# Patient Record
Sex: FEMALE | Race: WHITE | Employment: FULL TIME | URBAN - METROPOLITAN AREA
[De-identification: names, ages, dates, MRNs, and addresses within clinical notes are randomized per-mention and may not be internally consistent; named-entity substitution may affect disease eponyms.]

---
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
## 2020-05-20 ENCOUNTER — HOSPITAL ENCOUNTER (OUTPATIENT)
Age: 20
Discharge: HOME | End: 2020-05-20
Payer: COMMERCIAL

## 2020-05-20 LAB
ALBUMIN SERPL-MCNC: 4.3 G/DL (ref 3.5–5.2)
ALBUMIN/GLOBULIN RATIO: 1.3 (ref 1–2.5)
ALP BLD-CCNC: 113 U/L (ref 35–104)
ALT SERPL-CCNC: 12 U/L (ref 5–33)
ANION GAP SERPL CALCULATED.3IONS-SCNC: 11 MMOL/L (ref 9–17)
AST SERPL-CCNC: 15 U/L
BILIRUB SERPL-MCNC: <0.1 MG/DL (ref 0.3–1.2)
BUN BLDV-MCNC: 11 MG/DL (ref 6–20)
BUN/CREAT BLD: 16 (ref 9–20)
CALCIUM SERPL-MCNC: 9.2 MG/DL (ref 8.6–10.4)
CHLORIDE BLD-SCNC: 107 MMOL/L (ref 98–107)
CHOLESTEROL/HDL RATIO: 4.3
CHOLESTEROL: 159 MG/DL
CO2: 23 MMOL/L (ref 20–31)
CREAT SERPL-MCNC: 0.68 MG/DL (ref 0.5–0.9)
ESTIMATED AVERAGE GLUCOSE: 97 MG/DL
GFR AFRICAN AMERICAN: >60 ML/MIN
GFR NON-AFRICAN AMERICAN: >60 ML/MIN
GFR SERPL CREATININE-BSD FRML MDRD: ABNORMAL ML/MIN/{1.73_M2}
GFR SERPL CREATININE-BSD FRML MDRD: ABNORMAL ML/MIN/{1.73_M2}
GLUCOSE BLD-MCNC: 92 MG/DL (ref 70–99)
HBA1C MFR BLD: 5 % (ref 4.8–5.9)
HDLC SERPL-MCNC: 37 MG/DL
LDL CHOLESTEROL: 94 MG/DL (ref 0–130)
POTASSIUM SERPL-SCNC: 4.2 MMOL/L (ref 3.7–5.3)
SODIUM BLD-SCNC: 141 MMOL/L (ref 135–144)
T3 FREE: 3.15 PG/ML (ref 2.02–4.43)
T3 TOTAL: 115 NG/DL (ref 80–200)
THYROXINE, FREE: 1.13 NG/DL (ref 0.93–1.7)
TOTAL PROTEIN: 7.7 G/DL (ref 6.4–8.3)
TRIGL SERPL-MCNC: 141 MG/DL
TSH SERPL DL<=0.05 MIU/L-ACNC: 0.87 MIU/L (ref 0.3–5)
VITAMIN D 25-HYDROXY: 10.2 NG/ML (ref 30–100)
VLDLC SERPL CALC-MCNC: ABNORMAL MG/DL (ref 1–30)

## 2020-05-20 PROCEDURE — 80061 LIPID PANEL: CPT

## 2020-05-20 PROCEDURE — 36415 COLL VENOUS BLD VENIPUNCTURE: CPT

## 2020-05-20 PROCEDURE — 84480 ASSAY TRIIODOTHYRONINE (T3): CPT

## 2020-05-20 PROCEDURE — 84443 ASSAY THYROID STIM HORMONE: CPT

## 2020-05-20 PROCEDURE — 82306 VITAMIN D 25 HYDROXY: CPT

## 2020-05-20 PROCEDURE — 84439 ASSAY OF FREE THYROXINE: CPT

## 2020-05-20 PROCEDURE — 84481 FREE ASSAY (FT-3): CPT

## 2020-05-20 PROCEDURE — 83036 HEMOGLOBIN GLYCOSYLATED A1C: CPT

## 2020-05-20 PROCEDURE — 80053 COMPREHEN METABOLIC PANEL: CPT

## 2020-06-10 ENCOUNTER — OFFICE VISIT (OUTPATIENT)
Dept: PRIMARY CARE CLINIC | Age: 20
End: 2020-06-10
Payer: COMMERCIAL

## 2020-06-10 VITALS
BODY MASS INDEX: 38.19 KG/M2 | DIASTOLIC BLOOD PRESSURE: 80 MMHG | HEIGHT: 65 IN | SYSTOLIC BLOOD PRESSURE: 118 MMHG | WEIGHT: 229.2 LBS | HEART RATE: 111 BPM | OXYGEN SATURATION: 98 %

## 2020-06-10 PROCEDURE — 90471 IMMUNIZATION ADMIN: CPT | Performed by: FAMILY MEDICINE

## 2020-06-10 PROCEDURE — 99395 PREV VISIT EST AGE 18-39: CPT | Performed by: FAMILY MEDICINE

## 2020-06-10 PROCEDURE — 90651 9VHPV VACCINE 2/3 DOSE IM: CPT | Performed by: FAMILY MEDICINE

## 2020-06-10 RX ORDER — ERGOCALCIFEROL 1.25 MG/1
50000 CAPSULE ORAL WEEKLY
Qty: 12 CAPSULE | Refills: 0 | Status: SHIPPED | OUTPATIENT
Start: 2020-06-10 | End: 2020-09-08 | Stop reason: SDUPTHER

## 2020-06-10 ASSESSMENT — ENCOUNTER SYMPTOMS
ABDOMINAL PAIN: 0
VOMITING: 0
NAUSEA: 0
CHEST TIGHTNESS: 0
SORE THROAT: 0
SHORTNESS OF BREATH: 0

## 2020-06-10 ASSESSMENT — PATIENT HEALTH QUESTIONNAIRE - PHQ9
SUM OF ALL RESPONSES TO PHQ QUESTIONS 1-9: 2
SUM OF ALL RESPONSES TO PHQ9 QUESTIONS 1 & 2: 2
SUM OF ALL RESPONSES TO PHQ QUESTIONS 1-9: 2
2. FEELING DOWN, DEPRESSED OR HOPELESS: 1
1. LITTLE INTEREST OR PLEASURE IN DOING THINGS: 1

## 2020-06-10 NOTE — PROGRESS NOTES
questions answered. Pt voiced understanding. Reviewed healthmaintenance. Instructed to continue current medications, diet and exercise. Patient agreed with treatment plan. Follow up as directed.      Electronically signed by Nati Muñiz DO on 6/10/2020 at 4:16 PM

## 2020-07-28 ENCOUNTER — OFFICE VISIT (OUTPATIENT)
Dept: OBGYN CLINIC | Age: 20
End: 2020-07-28
Payer: COMMERCIAL

## 2020-07-28 ENCOUNTER — HOSPITAL ENCOUNTER (OUTPATIENT)
Age: 20
Setting detail: SPECIMEN
Discharge: HOME | End: 2020-07-28
Payer: COMMERCIAL

## 2020-07-28 VITALS
RESPIRATION RATE: 16 BRPM | SYSTOLIC BLOOD PRESSURE: 120 MMHG | TEMPERATURE: 97.2 F | BODY MASS INDEX: 38.44 KG/M2 | WEIGHT: 231 LBS | DIASTOLIC BLOOD PRESSURE: 70 MMHG

## 2020-07-28 LAB
CONTROL: PRESENT
DIRECT EXAM: NORMAL
Lab: NORMAL
PREGNANCY TEST URINE, POC: NEGATIVE
SPECIMEN DESCRIPTION: NORMAL

## 2020-07-28 PROCEDURE — G8417 CALC BMI ABV UP PARAM F/U: HCPCS | Performed by: NURSE PRACTITIONER

## 2020-07-28 PROCEDURE — 81025 URINE PREGNANCY TEST: CPT | Performed by: NURSE PRACTITIONER

## 2020-07-28 PROCEDURE — G8427 DOCREV CUR MEDS BY ELIG CLIN: HCPCS | Performed by: NURSE PRACTITIONER

## 2020-07-28 PROCEDURE — 99213 OFFICE O/P EST LOW 20 MIN: CPT | Performed by: NURSE PRACTITIONER

## 2020-07-28 PROCEDURE — 4004F PT TOBACCO SCREEN RCVD TLK: CPT | Performed by: NURSE PRACTITIONER

## 2020-07-28 RX ORDER — MISOPROSTOL 200 UG/1
TABLET ORAL
Qty: 2 TABLET | Refills: 0 | Status: SHIPPED | OUTPATIENT
Start: 2020-07-28

## 2020-07-28 ASSESSMENT — ENCOUNTER SYMPTOMS
PHOTOPHOBIA: 0
SHORTNESS OF BREATH: 0
NAUSEA: 0
COLOR CHANGE: 0
VOMITING: 0
WHEEZING: 0

## 2020-07-28 NOTE — PATIENT INSTRUCTIONS
Patient Education       Learning About Birth Control: Intrauterine Device (IUD)  What is an intrauterine device (IUD)? The intrauterine device (IUD) is used to prevent pregnancy. It's a small, plastic, T-shaped device. Your doctor places the IUD in your uterus. If you and your doctor discuss it before you give birth, this can be done right after you have your baby. You have a choice between a hormonal IUD and a copper IUD. The hormonal IUD can prevent pregnancy for 3 to 6 years, depending on which IUD is used. But your doctor may talk to you about leaving it in for longer. Once you have it, you don't have to do anything else to prevent pregnancy. The copper IUD can prevent pregnancy for 10 years. But your doctor may talk to you about leaving it in for longer. Once you have it, you don't have to do anything else to prevent pregnancy. A string tied to the end of the IUD hangs down through the opening of the uterus (called the cervix) into the vagina. You can check that the IUD is in place by feeling for the string. The IUD usually stays in the uterus until your doctor removes it. How well does it work? In the first year of use:  · When the hormonal IUD is used exactly as directed, fewer than 1 woman out of 100 has an unplanned pregnancy. · When the copper IUD is used exactly as directed, fewer than 1 woman out of 100 has an unplanned pregnancy. Be sure to tell your doctor about any health problems you have or medicines you take. He or she can help you choose the birth control method that is right for you. What are the advantages of an IUD? · An IUD is one of the most effective methods of birth control. · It prevents pregnancy for 3 to 10 years, depending on the type. You don't have to worry about birth control during this time. · It's safe to use while breastfeeding. · IUDs don't contain estrogen.  So you can use an IUD if you don't want to take estrogen or can't take estrogen because you have certain health problems or concerns. · An IUD is convenient. It is always providing birth control. You don't need to remember to take a pill or get a shot. You don't have to interrupt sex to protect against pregnancy. · A hormonal IUD may reduce heavy bleeding and cramping. What are the disadvantages of an IUD? · An IUD doesn't protect against sexually transmitted infections (STIs), such as herpes or HIV/AIDS. If you aren't sure if your sex partner might have an STI, use a condom to protect against disease. · A copper IUD may cause periods with more bleeding and cramping. · You have to see a doctor to have an IUD inserted and removed. Where can you learn more? Go to https://SplorepeAleaeb.healthBrowsy. org and sign in to your "[employer] [place]" account. Enter R862 in the Data Symmetry box to learn more about \"Learning About Birth Control: Intrauterine Device (IUD). \"     If you do not have an account, please click on the \"Sign Up Now\" link. Current as of: February 11, 2020               Content Version: 12.5  © 6974-5015 Healthwise, Incorporated. Care instructions adapted under license by [facility] ([facility]). If you have questions about a medical condition or this instruction, always ask your healthcare professional. [address] any warranty or liability for your use of this information.

## 2020-07-28 NOTE — PROGRESS NOTES
[facility] PHYSICIANS  [facility]  [id number] [place] [address] Maisha Connolly  [address] [address]  Dept: [phone]  Dept Fax: [phone]    Thi Sahu is a 21 y.o. female who presents today for her medical conditions/complaintsas noted below. Thi Sahu is c/o of Other (discuss birth control options interested in IUD )        HPI:     HPI    Thi Sahu is here to discuss hx of irregular periods/ painful periods and treatment options. Her cycle is every 28-30 days and lasts 5-7 days. She has had hx of irregular periods when she was younger and would go months without periods, she has hx of painful periods, states has tried to learn to deal withit. HX previous contraception usage condoms only   Menses age 15-16  LMP 6/28/20  Sexually active yes, 1st coitus 25, number of partners 8, has been  With ne partner for past year.    Last PAP: has never had  Hx of STI denies  Last sexually active 3 weeks ago    She reports there is a personal history or family history of:    Smoking (> 15 cigs/day): yes,     Migraine with Aura: no    HTN (> 160/100):  no    DVT:  no    Thrombophilias:  no    Stroke (CVA): no    Ischemic heart disease:  no    Valvular heart disease (A Fib, Pul HTN, etc): no    Positive Antiphospholipid Abs:  no    Liver Disease:  no        Past Medical History:   Diagnosis Date    Anxiety     Prediabetes     Seizures (HCC)     child      Past Surgical History:   Procedure Laterality Date    WISDOM TOOTH EXTRACTION         Family History   Problem Relation Age of Onset    Diabetes Father     Cancer Maternal Grandfather     Cancer Paternal Grandmother        Social History     Tobacco Use    Smoking status: Current Some Day Smoker    Smokeless tobacco: Never Used    Tobacco comment: juul   Substance Use Topics    Alcohol use: No      Current Outpatient Medications   Medication Sig Dispense Refill    miSOPROStol (CYTOTEC) 200 MCG tablet Take one tablet by mouth night prior to the procedure and the second tablet the morning of procedure. 2 tablet 0    lurasidone (LATUDA) 40 MG TABS tablet Take 40 mg by mouth daily      vitamin D (ERGOCALCIFEROL) 1.25 MG (51383 UT) CAPS capsule Take 1 capsule by mouth once a week 12 capsule 0    busPIRone (BUSPAR) 5 MG tablet Take 5 mg by mouth 3 times daily      benzoyl peroxide 5 % external liquid Wash face, chest, back, buttocks and thighs daily 227 g 6    tretinoin (RETIN-A) 0.025 % cream Apply pea sized amount to face, chest and back nightly 20 g 6     No current facility-administered medications for this visit. No Known Allergies    Health Maintenance   Topic Date Due    Pneumococcal 0-64 years Vaccine (1 of 1 - PPSV23) 02/16/2006    Chlamydia screen  02/16/2016    Flu vaccine (1) 09/01/2020    DTaP/Tdap/Td vaccine (7 - Tdap) 06/08/2022    Hepatitis B vaccine  Completed    Hib vaccine  Completed    HPV vaccine  Completed    Varicella vaccine  Completed    Meningococcal (ACWY) vaccine  Completed    HIV screen  Completed    Hepatitis A vaccine  Aged Out       :     Review of Systems   Constitutional: Negative for chills, fatigue and fever. Eyes: Negative for photophobia and visual disturbance. Respiratory: Negative for shortness of breath and wheezing. Cardiovascular: Negative for chest pain, palpitations and leg swelling. Gastrointestinal: Negative for nausea and vomiting. Endocrine: Negative for cold intolerance, heat intolerance, polydipsia and polyuria. Genitourinary: Positive for menstrual problem. Negative for dyspareunia, dysuria, flank pain, frequency, pelvic pain, vaginal bleeding, vaginal discharge and vaginal pain. Musculoskeletal: Negative for neck pain and neck stiffness. Skin: Negative for color change and pallor. Neurological: Negative for dizziness, light-headedness and headaches. Hematological: Negative for adenopathy. Does not bruise/bleed easily.    Psychiatric/Behavioral: Negative for self-injury and suicidal ideas. Objective:     Physical Exam  Vitals signs and nursing note reviewed. Constitutional:       General: She is not in acute distress. Appearance: She is well-developed. She is not diaphoretic. HENT:      Head: Normocephalic and atraumatic. Right Ear: External ear normal.      Left Ear: External ear normal.      Nose: Nose normal.   Eyes:      Pupils: Pupils are equal, round, and reactive to light. Neck:      Musculoskeletal: Normal range of motion and neck supple. Cardiovascular:      Rate and Rhythm: Normal rate and regular rhythm. Heart sounds: Normal heart sounds. No murmur. No friction rub. No gallop. Pulmonary:      Effort: Pulmonary effort is normal.      Breath sounds: Normal breath sounds. No wheezing. Abdominal:      General: Bowel sounds are normal.      Palpations: Abdomen is soft. Abdomen is not rigid. Tenderness: There is no abdominal tenderness. There is no guarding or rebound. Hernia: There is no hernia in the left inguinal area. Genitourinary:     Labia:         Right: No rash, tenderness, lesion or injury. Left: No rash, tenderness, lesion or injury. Vagina: No signs of injury and foreign body. Vaginal discharge present. No erythema, tenderness or bleeding. Cervix: No cervical motion tenderness, discharge or friability. Uterus: Not enlarged and not tender. Adnexa:         Right: No mass, tenderness or fullness. Left: No mass, tenderness or fullness. Musculoskeletal: Normal range of motion. Skin:     General: Skin is warm and dry. Findings: No rash. Neurological:      Mental Status: She is alert and oriented to person, place, and time. Cranial Nerves: No cranial nerve deficit. Psychiatric:         Behavior: Behavior normal.         Thought Content:  Thought content normal.         Judgment: Judgment normal.       /70 (Site: Left Upper Arm, Position: Sitting, Cuff Size: Large Adult)   Temp 97.2 °F (36.2 °C) (Temporal)   Resp 16   Wt 231 lb (104.8 kg)   LMP 06/28/2020 (Approximate)   Breastfeeding No   BMI 38.44 kg/m²     Assessment:       Diagnosis Orders   1. Irregular periods  VAGINITIS DNA PROBE    C.trachomatis N.gonorrhoeae DNA   2. Amenorrhea  POCT urine pregnancy       :        1. Jewell Moore  was seen today for her complaint. She was counseled on the various forms of contraception, including the male forms, the reversible forms and the non-reversible forms as needed. She desired to have IUD placed. She was last sexually active 3 weeks ago. She had negative pregnancy test. She will call us as soon as she starts her menses. Discussed and counseled on all forms hormonal contraceptives and she would like to have IUD placed. Discussed R/B/A with IUD. Cultures collected and sent. Abstain sexual intercourse until IUD placement, if 2 weeks can have urine hcg in office if not needs serum hcg day prior to placement. encouraged to return to office on menses for IUD placement   Cytotec 200mg night before and morning of placement of IUD. She was made aware that hormone-based contraception may increase her risk of developing a blood clot which may in turn increase both her mortality and morbidity risks. The life-threatening side effects discussed included SOB, chest pains, severe HA's and/or calf pain (ACHES). She was advised that if these occur, she is to stop her contraception, notify our office and present to the ER for evaluation. She is  a smoker: As a smoker, she was informed that smoking may further increase these risks. She was encouraged to stop smoking and information was provided. She was instructed to use Barrier protection for STI prevention at all times. She was informed of potential drug interaction with antibiotics, herbals and seizure medications and was instructed to use barrier protection as secondary back up as needed.   2.  She was advised of the need for yearly follow up, has not had pap. Return for as soon as starts menses iud placement. Orders Placed This Encounter   Procedures    VAGINITIS DNA PROBE     Standing Status:   Future     Number of Occurrences:   1     Standing Expiration Date:   7/28/2021    C.trachomatis N.gonorrhoeae DNA     Standing Status:   Future     Number of Occurrences:   1     Standing Expiration Date:   7/29/2021    POCT urine pregnancy     Orders Placed This Encounter   Medications    miSOPROStol (CYTOTEC) 200 MCG tablet     Sig: Take one tablet by mouth night prior to the procedure and the second tablet the morning of procedure. Dispense:  2 tablet     Refill:  0       Patient given educational materials - seepatient instructions. Discussed use, benefit, and side effects of prescribed medications. All patient questions answered. Pt voiced understanding. Reviewed health maintenance. Instructed to continue current medications, diet and exercise. Patient agreedwith treatment plan. Follow up as directed. Electronically signed by MANDEEP Dowell CNP on 7/28/2020at 6:04 PM      Of the 15 minute duration appointment visit, Sara Ferrell CNP spent at least 50% of the face-to-face time in counseling, explanation of diagnosis, planning of further management, and answering all questions.

## 2020-07-29 LAB
C TRACH DNA GENITAL QL NAA+PROBE: NEGATIVE
N. GONORRHOEAE DNA: NEGATIVE
SPECIMEN DESCRIPTION: NORMAL

## 2020-08-11 ENCOUNTER — TELEPHONE (OUTPATIENT)
Dept: PRIMARY CARE CLINIC | Age: 20
End: 2020-08-11

## 2020-09-08 RX ORDER — ERGOCALCIFEROL 1.25 MG/1
CAPSULE ORAL
Qty: 12 CAPSULE | Refills: 0 | Status: SHIPPED | OUTPATIENT
Start: 2020-09-08 | End: 2020-12-03

## 2020-09-08 NOTE — TELEPHONE ENCOUNTER
Last OV 06/10/2020    Health Maintenance   Topic Date Due    Pneumococcal 0-64 years Vaccine (1 of 1 - PPSV23) 02/16/2006    Flu vaccine (1) 09/01/2020    Chlamydia screen  07/28/2021    DTaP/Tdap/Td vaccine (7 - Tdap) 06/08/2022    Hepatitis B vaccine  Completed    Hib vaccine  Completed    HPV vaccine  Completed    Varicella vaccine  Completed    Meningococcal (ACWY) vaccine  Completed    HIV screen  Completed    Hepatitis A vaccine  Aged Out             (applicable per patient's age: Cancer Screenings, Depression Screening, Fall Risk Screening, Immunizations)    Hemoglobin A1C (%)   Date Value   05/20/2020 5.0   12/10/2019 5.0   06/05/2019 5.3     LDL Cholesterol (mg/dL)   Date Value   05/20/2020 94     AST (U/L)   Date Value   05/20/2020 15     ALT (U/L)   Date Value   05/20/2020 12     BUN (mg/dL)   Date Value   05/20/2020 11      (goal A1C is < 7)   (goal LDL is <100) need 30-50% reduction from baseline     BP Readings from Last 3 Encounters:   07/28/20 120/70   06/10/20 118/80   12/09/19 118/82    (goal /80)      All Future Testing planned in [facility]:  Lab Frequency Next Occurrence   Vitamin D 25 Hydroxy Once 10/16/2020       Next Visit Date:  No future appointments.          Patient Active Problem List:     Insulin resistance     Panic attack

## 2020-10-02 LAB
CHOLESTEROL, TOTAL: 178 MG/DL
CHOLESTEROL/HDL RATIO: 4.9
HDLC SERPL-MCNC: 36 MG/DL (ref 35–70)
LDL CHOLESTEROL CALCULATED: 117 MG/DL (ref 0–160)
NONHDLC SERPL-MCNC: 142 MG/DL
TRIGL SERPL-MCNC: 124 MG/DL
VLDLC SERPL CALC-MCNC: 25 MG/DL

## 2020-12-03 RX ORDER — ERGOCALCIFEROL 1.25 MG/1
CAPSULE ORAL
Qty: 12 CAPSULE | Refills: 0 | Status: SHIPPED | OUTPATIENT
Start: 2020-12-03

## 2021-04-22 ENCOUNTER — OFFICE VISIT (OUTPATIENT)
Dept: PRIMARY CARE CLINIC | Age: 21
End: 2021-04-22
Payer: COMMERCIAL

## 2021-04-22 VITALS
BODY MASS INDEX: 38.15 KG/M2 | WEIGHT: 229 LBS | DIASTOLIC BLOOD PRESSURE: 80 MMHG | SYSTOLIC BLOOD PRESSURE: 118 MMHG | HEIGHT: 65 IN

## 2021-04-22 DIAGNOSIS — R11.0 NAUSEA: ICD-10-CM

## 2021-04-22 DIAGNOSIS — L65.9 HAIR LOSS: Primary | ICD-10-CM

## 2021-04-22 DIAGNOSIS — L70.0 ACNE VULGARIS: ICD-10-CM

## 2021-04-22 PROCEDURE — 4004F PT TOBACCO SCREEN RCVD TLK: CPT | Performed by: FAMILY MEDICINE

## 2021-04-22 PROCEDURE — 99214 OFFICE O/P EST MOD 30 MIN: CPT | Performed by: FAMILY MEDICINE

## 2021-04-22 PROCEDURE — G8427 DOCREV CUR MEDS BY ELIG CLIN: HCPCS | Performed by: FAMILY MEDICINE

## 2021-04-22 PROCEDURE — G8417 CALC BMI ABV UP PARAM F/U: HCPCS | Performed by: FAMILY MEDICINE

## 2021-04-22 RX ORDER — CLINDAMYCIN AND BENZOYL PEROXIDE 10; 50 MG/G; MG/G
GEL TOPICAL
Qty: 50 G | Refills: 1 | Status: SHIPPED | OUTPATIENT
Start: 2021-04-22

## 2021-04-22 RX ORDER — PROPRANOLOL HYDROCHLORIDE 10 MG/1
TABLET ORAL
COMMUNITY
Start: 2021-04-02

## 2021-04-22 ASSESSMENT — ENCOUNTER SYMPTOMS
ABDOMINAL PAIN: 0
SHORTNESS OF BREATH: 0
NAUSEA: 1
VOMITING: 0
ROS SKIN COMMENTS: ACNE

## 2021-04-22 ASSESSMENT — PATIENT HEALTH QUESTIONNAIRE - PHQ9
1. LITTLE INTEREST OR PLEASURE IN DOING THINGS: 0
SUM OF ALL RESPONSES TO PHQ QUESTIONS 1-9: 0
SUM OF ALL RESPONSES TO PHQ QUESTIONS 1-9: 0

## 2021-04-22 NOTE — PROGRESS NOTES
[address] PRIMARY CARE  [facility] [address] [facility] [address]  [address] [address]  Dept: [phone]  Dept Fax: [phone]    Neo Bruce is a 24 y.o. female who presents today for her medical conditions/complaints as noted below. Neo Bruce is c/o of  Chief Complaint   Patient presents with    Acne    Alopecia    Nausea       HPI:     HPI     Pt here due to acne/alopecia/nausea    She has noted has been having hair loss for past few months. Mainly receding hair line and when she brushes notes hair loss. Has acne on face, has been using cerave face wash which has been helpful. Notes also she has some  nausea with food for few months. Has regular periods,  last period. Notes it with sweets and chocolate or soda and lemonade can get some nausea. States she has not been feeling nauseous with other foods.        Hemoglobin A1C (%)   Date Value   2020 5.0   12/10/2019 5.0   2019 5.3             ( goal A1C is < 7)   No results found for: LABMICR  LDL Cholesterol (mg/dL)   Date Value   2020 94     LDL Calculated (mg/dL)   Date Value   10/02/2020 117       (goal LDL is <100)   AST (U/L)   Date Value   2020 15     ALT (U/L)   Date Value   2020 12     BUN (mg/dL)   Date Value   2020 11     BP Readings from Last 3 Encounters:   21 118/80   20 120/70   06/10/20 118/80          (goal 120/80)    Past Medical History:   Diagnosis Date    Anxiety     Prediabetes     Seizures (HCC)     child      Past Surgical History:   Procedure Laterality Date    WISDOM TOOTH EXTRACTION         Family History   Problem Relation Age of Onset    Diabetes Father     Cancer Maternal Grandfather     Cancer Paternal Grandmother        Social History     Tobacco Use    Smoking status: Current Some Day Smoker    Smokeless tobacco: Never Used    Tobacco comment: juul   Substance Use Topics    Alcohol use: No      Current Outpatient Medications   Medication Sig Dispense Refill    clindamycin-benzoyl peroxide (BENZACLIN) 1-5 % gel Apply topically 2 times daily. 50 g 1    vitamin D (ERGOCALCIFEROL) 1.25 MG (74554 UT) CAPS capsule TAKE ONE CAPSULE BY MOUTH ONCE WEEKLY 12 capsule 0    lurasidone (LATUDA) 40 MG TABS tablet Take 40 mg by mouth daily      benzoyl peroxide 5 % external liquid Wash face, chest, back, buttocks and thighs daily 227 g 6    tretinoin (RETIN-A) 0.025 % cream Apply pea sized amount to face, chest and back nightly 20 g 6    propranolol (INDERAL) 10 MG tablet       miSOPROStol (CYTOTEC) 200 MCG tablet Take one tablet by mouth night prior to the procedure and the second tablet the morning of procedure. (Patient not taking: Reported on 4/22/2021) 2 tablet 0    busPIRone (BUSPAR) 5 MG tablet Take 5 mg by mouth 3 times daily       No current facility-administered medications for this visit. No Known Allergies    Health Maintenance   Topic Date Due    Hepatitis C screen  Never done    Pneumococcal 0-64 years Vaccine (1 of 1 - PPSV23) Never done    COVID-19 Vaccine (1) Never done    Cervical cancer screen  Never done    Chlamydia screen  07/28/2021    Flu vaccine (Season Ended) 09/01/2021    DTaP/Tdap/Td vaccine (7 - Tdap) 06/08/2022    Hepatitis B vaccine  Completed    Hib vaccine  Completed    HPV vaccine  Completed    Varicella vaccine  Completed    Meningococcal (ACWY) vaccine  Completed    HIV screen  Completed    Hepatitis A vaccine  Aged Out       Subjective:      Review of Systems   Constitutional: Negative for chills and fever. Respiratory: Negative for shortness of breath. Gastrointestinal: Positive for nausea. Negative for abdominal pain, diarrhea and vomiting. Endocrine:        Hair loss   Skin:        Acne         Objective:     Physical Exam  Constitutional:       Appearance: She is well-developed. HENT:      Head: Normocephalic and atraumatic.    Eyes: Conjunctiva/sclera: Conjunctivae normal.      Pupils: Pupils are equal, round, and reactive to light. Neck:      Musculoskeletal: Neck supple. Cardiovascular:      Rate and Rhythm: Normal rate and regular rhythm. Heart sounds: Normal heart sounds. Pulmonary:      Effort: Pulmonary effort is normal.      Breath sounds: Normal breath sounds. Skin:     General: Skin is warm and dry. Comments: Receding hair line noted,    Neurological:      Mental Status: She is alert and oriented to person, place, and time. Psychiatric:         Mood and Affect: Mood normal.         Behavior: Behavior normal.         Thought Content: Thought content normal.       /80   Ht 5' 5\" (1.651 m)   Wt 229 lb (103.9 kg)   BMI 38.11 kg/m²     Assessment:      1. Hair loss  - recommend we check labs. Consider seeing dermatology in future as well. - Comprehensive Metabolic Panel; Future  - TSH With Reflex Ft4; Future  - CBC With Auto Differential; Future    2. Nausea  - has been improving, recommend avoiding the foods causing her nausea. If not improving or worsening recommend seeing GI.   - Comprehensive Metabolic Panel; Future  - TSH With Reflex Ft4; Future    3. Acne vulgaris  - clindamycin-benzoyl peroxide (BENZACLIN) 1-5 % gel; Apply topically 2 times daily. Dispense: 50 g; Refill: 1           Plan:      Return in about 3 months (around 7/22/2021) for follow up. Orders Placed This Encounter   Procedures    Comprehensive Metabolic Panel     Standing Status:   Future     Standing Expiration Date:   4/22/2022    TSH With Reflex Ft4     Standing Status:   Future     Standing Expiration Date:   4/22/2022    CBC With Auto Differential     Standing Status:   Future     Standing Expiration Date:   4/22/2022     Orders Placed This Encounter   Medications    clindamycin-benzoyl peroxide (BENZACLIN) 1-5 % gel     Sig: Apply topically 2 times daily.      Dispense:  50 g     Refill:  1        Patient given educational materials - see patient instructions. Discussed use, benefit, and side effects of prescribed medications. All patient questions answered. Pt voiced understanding. Reviewed healthmaintenance. Instructed to continue current medications, diet and exercise. Patient agreed with treatment plan. Follow up as directed.      Electronically signed by Ronal Garcia DO on 4/25/2021 at 9:35 PM

## 2021-04-25 ASSESSMENT — ENCOUNTER SYMPTOMS: DIARRHEA: 0

## 2021-05-05 ENCOUNTER — HOSPITAL ENCOUNTER (OUTPATIENT)
Age: 21
Discharge: HOME | End: 2021-05-05
Payer: COMMERCIAL

## 2021-05-05 DIAGNOSIS — L65.9 HAIR LOSS: ICD-10-CM

## 2021-05-05 DIAGNOSIS — R11.0 NAUSEA: ICD-10-CM

## 2021-05-05 LAB
ABSOLUTE EOS #: 0.48 K/UL (ref 0–0.44)
ABSOLUTE IMMATURE GRANULOCYTE: 0.06 K/UL (ref 0–0.3)
ABSOLUTE LYMPH #: 4.86 K/UL (ref 1.1–3.7)
ABSOLUTE MONO #: 0.87 K/UL (ref 0.1–1.4)
ALBUMIN SERPL-MCNC: 4.3 G/DL (ref 3.5–5.2)
ALBUMIN/GLOBULIN RATIO: 1.2 (ref 1–2.5)
ALP BLD-CCNC: 143 U/L (ref 35–104)
ALT SERPL-CCNC: 18 U/L (ref 5–33)
ANION GAP SERPL CALCULATED.3IONS-SCNC: 8 MMOL/L (ref 9–17)
AST SERPL-CCNC: 15 U/L
BASOPHILS # BLD: 1 % (ref 0–2)
BASOPHILS ABSOLUTE: 0.08 K/UL (ref 0–0.2)
BILIRUB SERPL-MCNC: 0.21 MG/DL (ref 0.3–1.2)
BUN BLDV-MCNC: 12 MG/DL (ref 6–20)
BUN/CREAT BLD: 14 (ref 9–20)
CALCIUM SERPL-MCNC: 10 MG/DL (ref 8.6–10.4)
CHLORIDE BLD-SCNC: 104 MMOL/L (ref 98–107)
CO2: 27 MMOL/L (ref 20–31)
CREAT SERPL-MCNC: 0.84 MG/DL (ref 0.5–0.9)
DIFFERENTIAL TYPE: ABNORMAL
EOSINOPHILS RELATIVE PERCENT: 3 % (ref 1–4)
GFR AFRICAN AMERICAN: >60 ML/MIN
GFR NON-AFRICAN AMERICAN: >60 ML/MIN
GFR SERPL CREATININE-BSD FRML MDRD: ABNORMAL ML/MIN/{1.73_M2}
GFR SERPL CREATININE-BSD FRML MDRD: ABNORMAL ML/MIN/{1.73_M2}
GLUCOSE BLD-MCNC: 94 MG/DL (ref 70–99)
HCT VFR BLD CALC: 41.5 % (ref 36.3–47.1)
HEMOGLOBIN: 13.1 G/DL (ref 11.9–15.1)
IMMATURE GRANULOCYTES: 0 %
LYMPHOCYTES # BLD: 33 % (ref 25–45)
MCH RBC QN AUTO: 27.5 PG (ref 25.2–33.5)
MCHC RBC AUTO-ENTMCNC: 31.6 G/DL (ref 28.4–34.8)
MCV RBC AUTO: 87 FL (ref 82.6–102.9)
MONOCYTES # BLD: 6 % (ref 2–8)
NRBC AUTOMATED: 0 PER 100 WBC
PDW BLD-RTO: 13.7 % (ref 11.8–14.4)
PLATELET # BLD: 399 K/UL (ref 138–453)
PLATELET ESTIMATE: ABNORMAL
PMV BLD AUTO: 9 FL (ref 8.1–13.5)
POTASSIUM SERPL-SCNC: 4.3 MMOL/L (ref 3.7–5.3)
RBC # BLD: 4.77 M/UL (ref 3.95–5.11)
RBC # BLD: ABNORMAL 10*6/UL
SEG NEUTROPHILS: 57 % (ref 34–64)
SEGMENTED NEUTROPHILS ABSOLUTE COUNT: 8.37 K/UL (ref 1.5–8.1)
SODIUM BLD-SCNC: 139 MMOL/L (ref 135–144)
TOTAL PROTEIN: 7.9 G/DL (ref 6.4–8.3)
TSH SERPL DL<=0.05 MIU/L-ACNC: 1.1 MIU/L (ref 0.3–5)
WBC # BLD: 14.7 K/UL (ref 4.5–13.5)
WBC # BLD: ABNORMAL 10*3/UL

## 2021-05-05 PROCEDURE — 80053 COMPREHEN METABOLIC PANEL: CPT

## 2021-05-05 PROCEDURE — 36415 COLL VENOUS BLD VENIPUNCTURE: CPT

## 2021-05-05 PROCEDURE — 85025 COMPLETE CBC W/AUTO DIFF WBC: CPT

## 2021-05-05 PROCEDURE — 84443 ASSAY THYROID STIM HORMONE: CPT

## 2021-05-10 DIAGNOSIS — E55.9 VITAMIN D DEFICIENCY: Primary | ICD-10-CM

## 2021-05-10 DIAGNOSIS — E21.3 HYPERPARATHYROIDISM (HCC): ICD-10-CM

## 2021-05-10 DIAGNOSIS — R74.8 ELEVATED ALKALINE PHOSPHATASE LEVEL: ICD-10-CM

## 2021-05-10 DIAGNOSIS — D72.829 LEUKOCYTOSIS, UNSPECIFIED TYPE: ICD-10-CM

## 2021-05-17 ENCOUNTER — HOSPITAL ENCOUNTER (OUTPATIENT)
Age: 21
Discharge: HOME | End: 2021-05-17
Payer: COMMERCIAL

## 2021-05-17 ENCOUNTER — INITIAL CONSULT (OUTPATIENT)
Dept: ONCOLOGY | Age: 21
End: 2021-05-17
Payer: COMMERCIAL

## 2021-05-17 ENCOUNTER — TELEPHONE (OUTPATIENT)
Dept: ONCOLOGY | Age: 21
End: 2021-05-17

## 2021-05-17 VITALS
DIASTOLIC BLOOD PRESSURE: 86 MMHG | BODY MASS INDEX: 38.14 KG/M2 | SYSTOLIC BLOOD PRESSURE: 130 MMHG | TEMPERATURE: 96.3 F | HEIGHT: 65 IN | WEIGHT: 228.9 LBS | HEART RATE: 73 BPM

## 2021-05-17 DIAGNOSIS — D72.829 LEUKOCYTOSIS, UNSPECIFIED TYPE: Primary | ICD-10-CM

## 2021-05-17 DIAGNOSIS — D72.829 LEUKOCYTOSIS, UNSPECIFIED TYPE: ICD-10-CM

## 2021-05-17 LAB
ABSOLUTE EOS #: 0.4 K/UL (ref 0–0.4)
ABSOLUTE IMMATURE GRANULOCYTE: ABNORMAL K/UL (ref 0–0.3)
ABSOLUTE LYMPH #: 3.4 K/UL (ref 1–4.8)
ABSOLUTE MONO #: 0.7 K/UL (ref 0.1–1.4)
BASOPHILS # BLD: 1 % (ref 0–2)
BASOPHILS ABSOLUTE: 0.1 K/UL (ref 0–0.2)
C-REACTIVE PROTEIN: 17.1 MG/L (ref 0–5)
DIFFERENTIAL TYPE: ABNORMAL
EOSINOPHILS RELATIVE PERCENT: 3 % (ref 1–4)
HCT VFR BLD CALC: 40.6 % (ref 36–46)
HEMOGLOBIN: 13.1 G/DL (ref 12–16)
IMMATURE GRANULOCYTES: ABNORMAL %
LYMPHOCYTES # BLD: 25 % (ref 25–45)
MCH RBC QN AUTO: 26.9 PG (ref 26–34)
MCHC RBC AUTO-ENTMCNC: 32.2 G/DL (ref 31–37)
MCV RBC AUTO: 83.5 FL (ref 80–100)
MONOCYTES # BLD: 5 % (ref 2–8)
NRBC AUTOMATED: ABNORMAL PER 100 WBC
PDW BLD-RTO: 14.6 % (ref 12.5–15.4)
PLATELET # BLD: 439 K/UL (ref 140–450)
PLATELET ESTIMATE: ABNORMAL
PMV BLD AUTO: 7.4 FL (ref 6–12)
RBC # BLD: 4.86 M/UL (ref 4–5.2)
RBC # BLD: ABNORMAL 10*6/UL
SEDIMENTATION RATE, ERYTHROCYTE: 50 MM (ref 0–20)
SEG NEUTROPHILS: 66 % (ref 34–64)
SEGMENTED NEUTROPHILS ABSOLUTE COUNT: 8.8 K/UL (ref 1.8–7.7)
WBC # BLD: 13.5 K/UL (ref 4.5–13.5)
WBC # BLD: ABNORMAL 10*3/UL

## 2021-05-17 PROCEDURE — G8417 CALC BMI ABV UP PARAM F/U: HCPCS | Performed by: INTERNAL MEDICINE

## 2021-05-17 PROCEDURE — 86140 C-REACTIVE PROTEIN: CPT

## 2021-05-17 PROCEDURE — 88184 FLOWCYTOMETRY/ TC 1 MARKER: CPT

## 2021-05-17 PROCEDURE — 81206 BCR/ABL1 GENE MAJOR BP: CPT

## 2021-05-17 PROCEDURE — 99211 OFF/OP EST MAY X REQ PHY/QHP: CPT | Performed by: INTERNAL MEDICINE

## 2021-05-17 PROCEDURE — 36415 COLL VENOUS BLD VENIPUNCTURE: CPT

## 2021-05-17 PROCEDURE — 99244 OFF/OP CNSLTJ NEW/EST MOD 40: CPT | Performed by: INTERNAL MEDICINE

## 2021-05-17 PROCEDURE — 85025 COMPLETE CBC W/AUTO DIFF WBC: CPT

## 2021-05-17 PROCEDURE — 85652 RBC SED RATE AUTOMATED: CPT

## 2021-05-17 PROCEDURE — G8427 DOCREV CUR MEDS BY ELIG CLIN: HCPCS | Performed by: INTERNAL MEDICINE

## 2021-05-17 PROCEDURE — 88185 FLOWCYTOMETRY/TC ADD-ON: CPT

## 2021-05-17 PROCEDURE — 81270 JAK2 GENE: CPT

## 2021-05-17 NOTE — PROGRESS NOTES
_               Ms. Hanna Pineda is a very pleasant 24 y.o. female with history of multiple co morbidities as listed. Patient is referred for evaluation of leukocytosis. Patient had routine lab test which was noted to have elevated white blood cells. Previous labs showed similar findings. Patient denies any fever or night sweats. No enlarged lymph nodes. No weight loss or decreased appetite. No problems with repeated infections. Patient has not been obtained on steroids or any other medications. No other events. Patient denies smoking or alcohol drinking. Cira Bob PAST MEDICAL HISTORY: has a past medical history of Anxiety, Prediabetes, and Seizures ([facility] [address] PAST SURGICAL HISTORY: has a past surgical history that includes [place] tooth extraction. CURRENT MEDICATIONS:  has a current medication list which includes the following prescription(s): propranolol, vitamin d, lurasidone, benzoyl peroxide, clindamycin-benzoyl peroxide, misoprostol, buspirone, and tretinoin. ALLERGIES:  has No Known Allergies. FAMILY HISTORY: Negative for any hematological or oncological conditions. SOCIAL HISTORY:  reports that she has been smoking. She started smoking about 3 years ago. She has never used smokeless tobacco. She reports that she does not drink alcohol and does not use drugs. REVIEW OF SYSTEMS:     · General: No weakness or fatigue. No unanticipated weight loss or decreased appetite. No fever or chills. · Eyes: No blurred vision, eye pain or double vision. · Ears: No hearing problems or drainage. No tinnitus. · Throat: No sore throat, problems with swallowing or dysphagia. · Respiratory: No cough, sputum or hemoptysis. No shortness of breath. No pleuritic chest pain. · Cardiovascular: No chest pain, orthopnea or PND. No lower extremity edema. No palpitation.    · Gastrointestinal: No problems with swallowing. No abdominal pain or bloating. No nausea or vomiting. No diarrhea or constipation. No GI bleeding. · Genitourinary: No dysuria, hematuria, frequency or urgency. · Musculoskeletal: No muscle aches or pains. No limitation of movement. No back pain. No gait disturbance, No joint complaints. · Dermatologic: No skin rashes or pruritus. No skin lesions or discolorations. · Psychiatric: No depression, anxiety, or stress or signs of schizophrenia. No change in mood or affect. · Hematologic: No history of bleeding tendency. No bruises or ecchymosis. No history of clotting problems. · Infectious disease: No fever, chills or frequent infections. · Endocrine: No polydipsia or polyuria. No temperature intolerance. · Neurologic: No headaches or dizziness. No weakness or numbness of the extremities. No changes in balance, coordination,  memory, mentation, behavior. · Allergic/Immunologic: No nasal congestion or hives. No repeated infections. PHYSICAL EXAM:  The patient is not in acute distress. Vital signs: Blood pressure 130/86, pulse 73, temperature 96.3 °F (35.7 °C), temperature source Temporal, height 5' 5\" (1.651 m), weight 228 lb 14.4 oz (103.8 kg), not currently breastfeeding.      General appearance - well appearing, not in pain or distress  Mental status - good mood, alert and oriented  Eyes - pupils equal and reactive, extraocular eye movements intact  Ears - bilateral TM's and external ear canals normal  Nose - normal and patent, no erythema, discharge or polyps  Mouth - mucous membranes moist, pharynx normal without lesions  Neck - supple, no significant adenopathy  Lymphatics - no palpable lymphadenopathy, no hepatosplenomegaly  Chest - clear to auscultation, no wheezes, rales or rhonchi, symmetric air entry  Heart - normal rate, regular rhythm, normal S1, S2, no murmurs, rubs, clicks or gallops  Abdomen - soft, nontender, nondistended, no masses or organomegaly  Neurological - alert, oriented, normal speech, no focal findings or movement disorder noted  Musculoskeletal - no joint tenderness, deformity or swelling  Extremities - peripheral pulses normal, no pedal edema, no clubbing or cyanosis  Skin - normal coloration and turgor, no rashes, no suspicious skin lesions noted     Review of Diagnostic data:   Lab Results   Component Value Date    WBC 13.5 05/17/2021    HGB 13.1 05/17/2021    HCT 40.6 05/17/2021    MCV 83.5 05/17/2021     05/17/2021       Chemistry        Component Value Date/Time     05/05/2021 0946    K 4.3 05/05/2021 0946     05/05/2021 0946    CO2 27 05/05/2021 0946    BUN 12 05/05/2021 0946    CREATININE 0.84 05/05/2021 0946        Component Value Date/Time    CALCIUM 10.0 05/05/2021 0946    ALKPHOS 143 (H) 05/05/2021 0946    AST 15 05/05/2021 0946    ALT 18 05/05/2021 0946    BILITOT 0.21 (L) 05/05/2021 0946            IMPRESSION:   Persistent leukocytosis with normal differential    PLAN: I reviewed the labs available to me and discussed with the patient. For more than 60 minutes of face to face discussion, I explained to the patient the nature of this hematologic problem. I explained the significance of these abnormalities in layman language. Previous labs from 2016 and 2019 showed evidence of leukocytosis. Repeated labs may of this year showed leukocytosis with normal differential.  Patient does not have any symptoms or signs to suggest underlying infections. At the same time she has normal hemoglobin and platelets and she has normal differential.  I could not identify any lymphadenopathy. So I believe this is reactive leukocytosis even though I do not see any underlying triggering cause. I doubt pulmonary disease or myeloproliferative disorder but I will rule out that possibility by checking the flow cytometry JAK2 gene mutation and BCR ABL. I will also check CRP and ESR.   We will contact the patient with the results and further recommendations. Patient's questions were answered to the best of her satisfaction and she verbalized full understanding and agreement.

## 2021-05-17 NOTE — LETTER
shortness of breath. No pleuritic chest pain. · Cardiovascular: No chest pain, orthopnea or PND. No lower extremity edema. No palpitation. · Gastrointestinal: No problems with swallowing. No abdominal pain or bloating. No nausea or vomiting. No diarrhea or constipation. No GI bleeding. · Genitourinary: No dysuria, hematuria, frequency or urgency. · Musculoskeletal: No muscle aches or pains. No limitation of movement. No back pain. No gait disturbance, No joint complaints. · Dermatologic: No skin rashes or pruritus. No skin lesions or discolorations. · Psychiatric: No depression, anxiety, or stress or signs of schizophrenia. No change in mood or affect. · Hematologic: No history of bleeding tendency. No bruises or ecchymosis. No history of clotting problems. · Infectious disease: No fever, chills or frequent infections. · Endocrine: No polydipsia or polyuria. No temperature intolerance. · Neurologic: No headaches or dizziness. No weakness or numbness of the extremities. No changes in balance, coordination,  memory, mentation, behavior. · Allergic/Immunologic: No nasal congestion or hives. No repeated infections. PHYSICAL EXAM:  The patient is not in acute distress. Vital signs: Blood pressure 130/86, pulse 73, temperature 96.3 °F (35.7 °C), temperature source Temporal, height 5' 5\" (1.651 m), weight 228 lb 14.4 oz (103.8 kg), not currently breastfeeding.      General appearance - well appearing, not in pain or distress  Mental status - good mood, alert and oriented  Eyes - pupils equal and reactive, extraocular eye movements intact  Ears - bilateral TM's and external ear canals normal  Nose - normal and patent, no erythema, discharge or polyps  Mouth - mucous membranes moist, pharynx normal without lesions  Neck - supple, no significant adenopathy  Lymphatics - no palpable lymphadenopathy, no hepatosplenomegaly  Chest - clear to auscultation, no wheezes, rales or rhonchi, symmetric air entry  Heart - normal rate, regular rhythm, normal S1, S2, no murmurs, rubs, clicks or gallops  Abdomen - soft, nontender, nondistended, no masses or organomegaly  Neurological - alert, oriented, normal speech, no focal findings or movement disorder noted  Musculoskeletal - no joint tenderness, deformity or swelling  Extremities - peripheral pulses normal, no pedal edema, no clubbing or cyanosis  Skin - normal coloration and turgor, no rashes, no suspicious skin lesions noted     Review of Diagnostic data:   Lab Results   Component Value Date    WBC 13.5 05/17/2021    HGB 13.1 05/17/2021    HCT 40.6 05/17/2021    MCV 83.5 05/17/2021     05/17/2021       Chemistry        Component Value Date/Time     05/05/2021 0946    K 4.3 05/05/2021 0946     05/05/2021 0946    CO2 27 05/05/2021 0946    BUN 12 05/05/2021 0946    CREATININE 0.84 05/05/2021 0946        Component Value Date/Time    CALCIUM 10.0 05/05/2021 0946    ALKPHOS 143 (H) 05/05/2021 0946    AST 15 05/05/2021 0946    ALT 18 05/05/2021 0946    BILITOT 0.21 (L) 05/05/2021 0946            IMPRESSION:   Persistent leukocytosis with normal differential    PLAN: I reviewed the labs available to me and discussed with the patient. For more than 60 minutes of face to face discussion, I explained to the patient the nature of this hematologic problem. I explained the significance of these abnormalities in layman language. Previous labs from 2016 and 2019 showed evidence of leukocytosis. Repeated labs may of this year showed leukocytosis with normal differential.  Patient does not have any symptoms or signs to suggest underlying infections. At the same time she has normal hemoglobin and platelets and she has normal differential.  I could not identify any lymphadenopathy. So I believe this is reactive leukocytosis even though I do not see any underlying triggering cause.   I doubt pulmonary disease or myeloproliferative disorder but I will rule out that possibility by checking the flow cytometry JAK2 gene mutation and BCR ABL. I will also check CRP and ESR. We will contact the patient with the results and further recommendations. Patient's questions were answered to the best of her satisfaction and she verbalized full understanding and agreement. If you have questions, please do not hesitate to call me. I look forward to following Alondra along with you. Sincerely,                            [facility]                            This note is created with the assistance of a speech recognition program.  While intending to generate a document that actually reflects the content of the visit, the document can still have some errors including those of syntax and sound a like substitutions which may escape proof reading. It such instances, actual meaning can be extrapolated by contextual diversion.

## 2021-05-19 LAB
FLOW CYTOMETRY BL: NORMAL
SURGICAL PATHOLOGY REPORT: NORMAL

## 2021-05-22 LAB
BCR-ABL QUANTITATIVE: NOT DETECTED
BCR-ABL1, PERCENT: 0 %
BCR/ABL SOURCE: NORMAL
EER BCR-ABL1, MAJOR: NORMAL

## 2021-05-24 LAB
JAK2 QNT, SOURCE: NORMAL
V617 MUTATION, PERCENT: 0 %
V617F MUTATION, QNT: NOT DETECTED

## 2021-05-25 ENCOUNTER — TELEPHONE (OUTPATIENT)
Dept: ONCOLOGY | Age: 21
End: 2021-05-25

## 2021-05-25 NOTE — TELEPHONE ENCOUNTER
Dr Angelito Keen reviewed lab results and stated that the patient's labs showed that the elevated WBCs are not caused by malignancy, but possible underlying inflammation. Dr Angelito Keen wants patient to follow up with PCP. Writer called patient and left a detailed voicemail informing her of above.  Daija Lim

## 2021-07-01 ENCOUNTER — OFFICE VISIT (OUTPATIENT)
Dept: PRIMARY CARE CLINIC | Age: 21
End: 2021-07-01
Payer: COMMERCIAL

## 2021-07-01 VITALS — SYSTOLIC BLOOD PRESSURE: 122 MMHG | HEART RATE: 69 BPM | OXYGEN SATURATION: 98 % | DIASTOLIC BLOOD PRESSURE: 78 MMHG

## 2021-07-01 DIAGNOSIS — M25.562 ARTHRALGIA OF BOTH KNEES: ICD-10-CM

## 2021-07-01 DIAGNOSIS — R70.0 ELEVATED SED RATE: ICD-10-CM

## 2021-07-01 DIAGNOSIS — F41.9 ANXIETY AND DEPRESSION: ICD-10-CM

## 2021-07-01 DIAGNOSIS — F32.A ANXIETY AND DEPRESSION: ICD-10-CM

## 2021-07-01 DIAGNOSIS — M54.50 CHRONIC BILATERAL LOW BACK PAIN WITHOUT SCIATICA: ICD-10-CM

## 2021-07-01 DIAGNOSIS — Z82.61 FAMILY HISTORY OF RHEUMATOID ARTHRITIS: ICD-10-CM

## 2021-07-01 DIAGNOSIS — G89.29 CHRONIC BILATERAL LOW BACK PAIN WITHOUT SCIATICA: ICD-10-CM

## 2021-07-01 DIAGNOSIS — M25.561 ARTHRALGIA OF BOTH KNEES: ICD-10-CM

## 2021-07-01 PROCEDURE — 4004F PT TOBACCO SCREEN RCVD TLK: CPT | Performed by: FAMILY MEDICINE

## 2021-07-01 PROCEDURE — G8417 CALC BMI ABV UP PARAM F/U: HCPCS | Performed by: FAMILY MEDICINE

## 2021-07-01 PROCEDURE — G8427 DOCREV CUR MEDS BY ELIG CLIN: HCPCS | Performed by: FAMILY MEDICINE

## 2021-07-01 PROCEDURE — 99214 OFFICE O/P EST MOD 30 MIN: CPT | Performed by: FAMILY MEDICINE

## 2021-07-01 SDOH — ECONOMIC STABILITY: FOOD INSECURITY: WITHIN THE PAST 12 MONTHS, YOU WORRIED THAT YOUR FOOD WOULD RUN OUT BEFORE YOU GOT MONEY TO BUY MORE.: NEVER TRUE

## 2021-07-01 SDOH — ECONOMIC STABILITY: FOOD INSECURITY: WITHIN THE PAST 12 MONTHS, THE FOOD YOU BOUGHT JUST DIDN'T LAST AND YOU DIDN'T HAVE MONEY TO GET MORE.: NEVER TRUE

## 2021-07-01 ASSESSMENT — ENCOUNTER SYMPTOMS
BACK PAIN: 1
NAUSEA: 0
SHORTNESS OF BREATH: 0
VOMITING: 0

## 2021-07-01 ASSESSMENT — SOCIAL DETERMINANTS OF HEALTH (SDOH): HOW HARD IS IT FOR YOU TO PAY FOR THE VERY BASICS LIKE FOOD, HOUSING, MEDICAL CARE, AND HEATING?: NOT HARD AT ALL

## 2021-07-01 NOTE — PROGRESS NOTES
[facility] PRIMARY CARE  [facility] [address] [facility] [address]  [address] [address]  Dept: [phone]  Dept Fax: [phone]    Song Baker is a 24 y.o. female who presents today for her medical conditions/complaints as noted below. Song Baker is c/o of  Chief Complaint   Patient presents with    Discuss Labs         HPI:     HPI     Pt here to discuss labs that she had done recently that showed inflammation. Pt was referred to heme onc due to elevated WBC count and had labs ordered by heme-onc that showed elevated sed rate and CRP , told signs of inflammation and labs negative for malignancy. She had labs ordered by heme-onc due to her elevated wbc count    Has bl knee pain, usually left knee>R knee. She also has chronic low back pain without sciatica, has gotten worse over the years. Her maternal grandmother has hx of rheumatoid arthritis . Back pain worsens when standing long periods of time. She is also in process looking for psychiatrist as well and therapist, was seeing one but would like to get second opinion on her diagnosis.        Hemoglobin A1C (%)   Date Value   2020 5.0   12/10/2019 5.0   2019 5.3             ( goal A1C is < 7)   No results found for: LABMICR  LDL Cholesterol (mg/dL)   Date Value   2020 94     LDL Calculated (mg/dL)   Date Value   10/02/2020 117       (goal LDL is <100)   AST (U/L)   Date Value   2021 15     ALT (U/L)   Date Value   2021 18     BUN (mg/dL)   Date Value   2021 12     BP Readings from Last 3 Encounters:   21 122/78   21 130/86   21 118/80          (goal 120/80)    Past Medical History:   Diagnosis Date    Anxiety     Prediabetes     Seizures (HCC)     child      Past Surgical History:   Procedure Laterality Date    WISDOM TOOTH EXTRACTION         Family History   Problem Relation Age of Onset    Diabetes Father     Cancer Maternal Grandfather     Cancer Paternal Grandmother        Social History     Tobacco Use    Smoking status: Current Some Day Smoker     Start date: 5/2/2018    Smokeless tobacco: Never Used    Tobacco comment: juul   Substance Use Topics    Alcohol use: No      Current Outpatient Medications   Medication Sig Dispense Refill    clindamycin-benzoyl peroxide (BENZACLIN) 1-5 % gel Apply topically 2 times daily. 50 g 1    vitamin D (ERGOCALCIFEROL) 1.25 MG (44471 UT) CAPS capsule TAKE ONE CAPSULE BY MOUTH ONCE WEEKLY 12 capsule 0    miSOPROStol (CYTOTEC) 200 MCG tablet Take one tablet by mouth night prior to the procedure and the second tablet the morning of procedure. 2 tablet 0    busPIRone (BUSPAR) 5 MG tablet Take 5 mg by mouth 3 times daily       benzoyl peroxide 5 % external liquid Wash face, chest, back, buttocks and thighs daily 227 g 6    tretinoin (RETIN-A) 0.025 % cream Apply pea sized amount to face, chest and back nightly 20 g 6    propranolol (INDERAL) 10 MG tablet  (Patient not taking: Reported on 7/1/2021)      lurasidone (LATUDA) 40 MG TABS tablet Take 40 mg by mouth daily (Patient not taking: Reported on 7/1/2021)       No current facility-administered medications for this visit. No Known Allergies    Health Maintenance   Topic Date Due    Hepatitis C screen  Never done    Pneumococcal 0-64 years Vaccine (1 of 2 - PPSV23) Never done    COVID-19 Vaccine (1) Never done    Cervical cancer screen  Never done    Chlamydia screen  07/28/2021    Flu vaccine (1) 09/01/2021    DTaP/Tdap/Td vaccine (7 - Tdap) 06/08/2022    Hepatitis B vaccine  Completed    Hib vaccine  Completed    HPV vaccine  Completed    Varicella vaccine  Completed    Meningococcal (ACWY) vaccine  Completed    HIV screen  Completed    Hepatitis A vaccine  Aged Out       Subjective:      Review of Systems   Constitutional: Negative for chills and fever. Respiratory: Negative for shortness of breath.     Cardiovascular: Negative for chest pain.   Gastrointestinal: Negative for nausea and vomiting. Musculoskeletal: Positive for arthralgias and back pain. Psychiatric/Behavioral: Positive for dysphoric mood. The patient is nervous/anxious. Objective:     Physical Exam  Constitutional:       Appearance: She is well-developed. HENT:      Head: Normocephalic and atraumatic. Eyes:      Conjunctiva/sclera: Conjunctivae normal.      Pupils: Pupils are equal, round, and reactive to light. Cardiovascular:      Rate and Rhythm: Normal rate and regular rhythm. Heart sounds: Normal heart sounds. Pulmonary:      Effort: Pulmonary effort is normal.      Breath sounds: Normal breath sounds. Musculoskeletal:      Cervical back: Neck supple. Comments: ttp left lumbar paraspinal muscles   Skin:     General: Skin is warm and dry. Neurological:      Mental Status: She is alert and oriented to person, place, and time. Psychiatric:         Mood and Affect: Mood normal.         Behavior: Behavior normal.         Thought Content: Thought content normal.       /78   Pulse 69   SpO2 98%     Assessment:      1. Chronic bilateral low back pain without sciatica  - recommend lower back stretches. Exercises provided. Recommend xray given the symptoms have gotten worse over time. Can take OTC tylenol or ibuprofen if needed. - XR LUMBAR SPINE (2-3 VIEWS); Future    2. Elevated sed rate  - gets joints pain and had elevated sed rate and crp . Recommend seeing rheumatology. - CARMITA Screen With Reflex; Future  - Rheumatoid Factor; Future  - AMANDA Lambert MD, Rheumatology, [facility]    3. Arthralgia of both knees  - CARMITA Screen With Reflex; Future  - Rheumatoid Factor; Future  - AMANDA Lambert MD, Rheumatology, [facility]    4. Family history of rheumatoid arthritis  - CARMITA Screen With Reflex; Future  - Rheumatoid Factor; [facility]    5.  Anxiety and depression  - I encouraged pt to follow up with psychiatry and offered therapy here at our office as well, but she will be trying to see one that her friend recommended. If needs referral here she will let me know. She wanted second opinion regarding the diagnosis of bipolar 2. She denies any manic episodes. Plan:      Return in about 6 months (around 1/1/2022) for follow up. Orders Placed This Encounter   Procedures    XR LUMBAR SPINE (2-3 VIEWS)     Standing Status:   Future     Standing Expiration Date:   7/1/2022     Order Specific Question:   Reason for exam:     Answer:   low back pain    CARMITA Screen With Reflex     Standing Status:   Future     Standing Expiration Date:   7/1/2022    Rheumatoid Factor     Standing Status:   Future     Standing Expiration Date:   7/1/2022    AFL - Mandy Delgadillo MD, Rheumatology, [place]     Referral Priority:   Routine     Referral Type:   Eval and Treat     Referral Reason:   Specialty Services Required     Referred to Provider:   Shelby Hunter MD     Requested Specialty:   Rheumatology     Number of Visits Requested:   1     No orders of the defined types were placed in this encounter. Patient given educational materials - see patient instructions. Discussed use, benefit, and side effects of prescribed medications. All patient questions answered. Pt voiced understanding. Reviewed healthmaintenance. Instructed to continue current medications, diet and exercise. Patient agreed with treatment plan. Follow up as directed.      Electronically signed by Nirmal Gama DO on 7/1/2021 at 4:07 PM

## 2021-07-01 NOTE — PATIENT INSTRUCTIONS
Patient Education        Low Back Arthritis: Exercises  Introduction  Here are some examples of typical rehabilitation exercises for your condition. Start each exercise slowly. Ease off the exercise if you start to have pain. Your doctor or physical therapist will tell you when you can start these exercises and which ones will work best for you. When you are not being active, find a comfortable position for rest. Some people are comfortable on the floor or a medium-firm bed with a small pillow under their head and another under their knees. Some people prefer to lie on their side with a pillow between their knees. Don't stay in one position for too long. Take short walks (10 to 20 minutes) every 2 to 3 hours. Avoid slopes, hills, and stairs until you feel better. Walk only distances you can manage without pain, especially leg pain. How to do the exercises  Pelvic tilt   1. Lie on your back with your knees bent. 2. \"Brace\" your stomachtighten your muscles by pulling in and imagining your belly button moving toward your spine. 3. Press your lower back into the floor. You should feel your hips and pelvis rock back. 4. Hold for 6 seconds while breathing smoothly. 5. Relax and allow your pelvis and hips to rock forward. 6. Repeat 8 to 12 times. Back stretches   1. Get down on your hands and knees on the floor. 2. Relax your head and allow it to droop. Round your back up toward the ceiling until you feel a nice stretch in your upper, middle, and lower back. Hold this stretch for as long as it feels comfortable, or about 15 to 30 seconds. 3. Return to the starting position with a flat back while you are on your hands and knees. 4. Let your back sway by pressing your stomach toward the floor. Lift your buttocks toward the ceiling. 5. Hold this position for 15 to 30 seconds. 6. Repeat 2 to 4 times. Follow-up care is a key part of your treatment and safety.  Be sure to make and go to all appointments, and call your doctor if you are having problems. It's also a good idea to know your test results and keep a list of the medicines you take. Where can you learn more? Go to [email or web address] and sign in to your [employer] account. Enter G777 in the TravelAI box to learn more about \"Low Back Arthritis: Exercises. \"     If you do not have an account, please click on the \"Sign Up Now\" link. Current as of: November 16, 2020               Content Version: 12.9  © 5771-3497 Healthwise, Incorporated. Care instructions adapted under license by [facility] ([facility]). If you have questions about a medical condition or this instruction, always ask your healthcare professional. [address] any warranty or liability for your use of this information.

## 2021-07-16 ENCOUNTER — HOSPITAL ENCOUNTER (OUTPATIENT)
Age: 21
Discharge: HOME | End: 2021-07-18
Payer: COMMERCIAL

## 2021-07-16 ENCOUNTER — HOSPITAL ENCOUNTER (OUTPATIENT)
Dept: GENERAL RADIOLOGY | Age: 21
Discharge: HOME | End: 2021-07-18
Payer: COMMERCIAL

## 2021-07-16 ENCOUNTER — HOSPITAL ENCOUNTER (OUTPATIENT)
Age: 21
Discharge: HOME | End: 2021-07-16
Payer: COMMERCIAL

## 2021-07-16 DIAGNOSIS — M25.562 ARTHRALGIA OF BOTH KNEES: ICD-10-CM

## 2021-07-16 DIAGNOSIS — R70.0 ELEVATED SED RATE: ICD-10-CM

## 2021-07-16 DIAGNOSIS — Z82.61 FAMILY HISTORY OF RHEUMATOID ARTHRITIS: ICD-10-CM

## 2021-07-16 DIAGNOSIS — G89.29 CHRONIC BILATERAL LOW BACK PAIN WITHOUT SCIATICA: ICD-10-CM

## 2021-07-16 DIAGNOSIS — M54.50 CHRONIC BILATERAL LOW BACK PAIN WITHOUT SCIATICA: ICD-10-CM

## 2021-07-16 DIAGNOSIS — M25.561 ARTHRALGIA OF BOTH KNEES: ICD-10-CM

## 2021-07-16 LAB — RHEUMATOID FACTOR: <10 IU/ML

## 2021-07-16 PROCEDURE — 86431 RHEUMATOID FACTOR QUANT: CPT

## 2021-07-16 PROCEDURE — 86225 DNA ANTIBODY NATIVE: CPT

## 2021-07-16 PROCEDURE — 86038 ANTINUCLEAR ANTIBODIES: CPT

## 2021-07-16 PROCEDURE — 72100 X-RAY EXAM L-S SPINE 2/3 VWS: CPT

## 2021-07-16 PROCEDURE — 36415 COLL VENOUS BLD VENIPUNCTURE: CPT

## 2021-07-18 LAB
ANTI DNA DOUBLE STRANDED: <0.5 IU/ML
ANTI-NUCLEAR ANTIBODY (ANA): NEGATIVE
ENA ANTIBODIES SCREEN: 0.1 U/ML

## 2021-11-14 ENCOUNTER — HOSPITAL ENCOUNTER (EMERGENCY)
Age: 21
Discharge: HOME | End: 2021-11-15
Attending: EMERGENCY MEDICINE
Payer: COMMERCIAL

## 2021-11-14 ENCOUNTER — APPOINTMENT (OUTPATIENT)
Dept: GENERAL RADIOLOGY | Age: 21
End: 2021-11-14
Payer: COMMERCIAL

## 2021-11-14 VITALS
DIASTOLIC BLOOD PRESSURE: 74 MMHG | TEMPERATURE: 97.9 F | HEART RATE: 84 BPM | SYSTOLIC BLOOD PRESSURE: 146 MMHG | RESPIRATION RATE: 19 BRPM | OXYGEN SATURATION: 95 %

## 2021-11-14 DIAGNOSIS — J35.1 ENLARGED TONSILS: Primary | ICD-10-CM

## 2021-11-14 LAB
DIRECT EXAM: NORMAL
Lab: NORMAL
SPECIMEN DESCRIPTION: NORMAL

## 2021-11-14 PROCEDURE — 6370000000 HC RX 637 (ALT 250 FOR IP): Performed by: EMERGENCY MEDICINE

## 2021-11-14 PROCEDURE — 71045 X-RAY EXAM CHEST 1 VIEW: CPT

## 2021-11-14 PROCEDURE — 93005 ELECTROCARDIOGRAM TRACING: CPT | Performed by: EMERGENCY MEDICINE

## 2021-11-14 PROCEDURE — 87880 STREP A ASSAY W/OPTIC: CPT

## 2021-11-14 PROCEDURE — 99283 EMERGENCY DEPT VISIT LOW MDM: CPT

## 2021-11-14 PROCEDURE — 70360 X-RAY EXAM OF NECK: CPT

## 2021-11-14 RX ORDER — IBUPROFEN 800 MG/1
800 TABLET ORAL ONCE
Status: COMPLETED | OUTPATIENT
Start: 2021-11-14 | End: 2021-11-14

## 2021-11-14 RX ADMIN — IBUPROFEN 800 MG: 800 TABLET, FILM COATED ORAL at 23:19

## 2021-11-14 ASSESSMENT — PAIN SCALES - GENERAL
PAINLEVEL_OUTOF10: 5
PAINLEVEL_OUTOF10: 7

## 2021-11-14 ASSESSMENT — PAIN DESCRIPTION - PAIN TYPE: TYPE: ACUTE PAIN

## 2021-11-14 ASSESSMENT — PAIN DESCRIPTION - LOCATION: LOCATION: CHEST

## 2021-11-15 LAB
EKG ATRIAL RATE: 75 BPM
EKG P AXIS: 38 DEGREES
EKG P-R INTERVAL: 138 MS
EKG Q-T INTERVAL: 394 MS
EKG QRS DURATION: 86 MS
EKG QTC CALCULATION (BAZETT): 439 MS
EKG R AXIS: 26 DEGREES
EKG T AXIS: 32 DEGREES
EKG VENTRICULAR RATE: 75 BPM

## 2021-11-15 PROCEDURE — 6360000002 HC RX W HCPCS: Performed by: EMERGENCY MEDICINE

## 2021-11-15 PROCEDURE — 93010 ELECTROCARDIOGRAM REPORT: CPT | Performed by: INTERNAL MEDICINE

## 2021-11-15 RX ORDER — IBUPROFEN 800 MG/1
800 TABLET ORAL EVERY 8 HOURS PRN
Qty: 21 TABLET | Refills: 0 | Status: SHIPPED | OUTPATIENT
Start: 2021-11-15

## 2021-11-15 RX ORDER — DEXAMETHASONE 4 MG/1
10 TABLET ORAL ONCE
Status: COMPLETED | OUTPATIENT
Start: 2021-11-15 | End: 2021-11-15

## 2021-11-15 RX ADMIN — DEXAMETHASONE 10 MG: 4 TABLET ORAL at 00:14

## 2021-11-15 ASSESSMENT — ENCOUNTER SYMPTOMS
ABDOMINAL DISTENTION: 0
WHEEZING: 0
RHINORRHEA: 0
SHORTNESS OF BREATH: 0
NAUSEA: 0
TROUBLE SWALLOWING: 1
VOMITING: 0
DIARRHEA: 0
CONSTIPATION: 0
SORE THROAT: 1
COUGH: 0

## 2021-11-15 NOTE — ED PROVIDER NOTES
[facility] ED  Emergency Department        Pt Name: Val Britt  MRN: [id number]  [place] 2000  Date of evaluation: 11/14/21    CHIEF COMPLAINT       Chief Complaint   Patient presents with    Chest Pain     mscp, onset this afternoon    Pharyngitis     since yesterday    Numbness     hands and feet       HISTORY OF PRESENT ILLNESS  (Location/Symptom, Timing/Onset, Context/Setting, Quality, Duration, ModifyingFactors, Severity.)      Val Britt is a 24 y.o. female who presents with sore throat that started yesterday and feeling swollen. She feels SOB, but not cough. She denies any fevers. Has not taken anything for her symptoms. She feels uncomfortable when swallowing, she is tolerating eating,   She just started taking Zoloft this past week. She started having some chest pain about an hour ago. Pressure, midsternal. Patient feeling better now. Felt like she has a anxiety attack and she felt her body went numb and all that has resolved. The patient has no prior history of venous thromboembolism. No recent travel. No recent surgery. No leg swelling. No hemoptysis. No estrogen containing medications. No history of malignancy. PAST MEDICAL / SURGICAL / SOCIAL / FAMILY HISTORY      has a past medical history of Anxiety, Prediabetes, and Seizures ([address] has a past surgical history that includes [place] tooth extraction.        Social History     Socioeconomic History    Marital status: Single     Spouse name: Not on file    Number of children: Not on file    Years of education: Not on file    Highest education level: Not on file   Occupational History    Not on file   Tobacco Use    Smoking status: Current Some Day Smoker     Start date: 5/2/2018    Smokeless tobacco: Never Used    Tobacco comment: juul   Vaping Use    Vaping Use: Every day    Substances: Always   Substance and Sexual Activity    Alcohol use: No    Drug use: No    Sexual activity: Not on file   Other Topics Concern    Not on file   Social History Narrative    Not on file     Social Determinants of Health     Financial Resource Strain: Low Risk     Difficulty of Paying Living Expenses: Not hard at all   Food Insecurity: No Food Insecurity    Worried About Running Out of Food in the Last Year: Never true    [address] in the Last Year: Never true   Transportation Needs:     Lack of Transportation (Medical): Not on file    Lack of Transportation (Non-Medical): Not on file   Physical Activity:     Days of Exercise per Week: Not on file    Minutes of Exercise per Session: Not on file   Stress:     Feeling of Stress : Not on file   Social Connections:     Frequency of Communication with Friends and Family: Not on file    Frequency of Social Gatherings with Friends and Family: Not on file    Attends Christian Services: Not on file    Active Member of [address] [place] [address] [employer] or Organizations: Not on file    Attends Club or Organization Meetings: Not on file    Marital Status: Not on file   Intimate Partner Violence:     Fear of Current or Ex-Partner: Not on file    Emotionally Abused: Not on file    Physically Abused: Not on file    Sexually Abused: Not on file   Housing Stability:     Unable to Pay for Housing in the Last Year: Not on file    Number of [place] in the Last Year: Not on file    Unstable Housing in the Last Year: Not on file       Family History   Problem Relation Age of Onset    Diabetes Father     Cancer Maternal Grandfather     Cancer Paternal Grandmother        Allergies:  Patient has no known allergies. Home Medications:  Prior to Admission medications    Medication Sig Start Date End Date Taking?  Authorizing Provider   ibuprofen (IBU) 800 MG tablet Take 1 tablet by mouth every 8 hours as needed for Pain 11/15/21  Yes Dora Crespo DO   sertraline (ZOLOFT) 50 MG tablet Take 50 mg by mouth daily   Yes Historical Provider, MD   propranolol (INDERAL) 10 MG tablet  4/2/21   Historical Provider, MD   clindamycin-benzoyl peroxide (BENZACLIN) 1-5 % gel Apply topically 2 times daily. 4/22/21   Lauren Medhkour, DO   vitamin D (ERGOCALCIFEROL) 1.25 MG (69234 UT) CAPS capsule TAKE ONE CAPSULE BY MOUTH ONCE WEEKLY 12/3/20   Lauren Medhkour, DO   miSOPROStol (CYTOTEC) 200 MCG tablet Take one tablet by mouth night prior to the procedure and the second tablet the morning of procedure. 7/28/20   Oris Severs, APRN - CNP   lurasidone (LATUDA) 40 MG TABS tablet Take 40 mg by mouth daily  Patient not taking: Reported on 7/1/2021    Historical Provider, MD   busPIRone (BUSPAR) 5 MG tablet Take 5 mg by mouth 3 times daily     Historical Provider, MD   benzoyl peroxide 5 % external liquid Wash face, chest, back, buttocks and thighs daily 4/18/18   Albina Lanier MD   tretinoin (RETIN-A) 0.025 % cream Apply pea sized amount to face, chest and back nightly 4/18/18   Albina Lanier MD       REVIEW OF SYSTEMS    (2-9 systems for level 4, 10 or more for level 5)      Review of Systems   Constitutional: Negative for activity change, appetite change, fatigue and fever. HENT: Positive for sore throat and trouble swallowing. Negative for congestion and rhinorrhea. Respiratory: Negative for cough, shortness of breath and wheezing. Cardiovascular: Negative for chest pain, palpitations and leg swelling. Gastrointestinal: Negative for abdominal distention, constipation, diarrhea, nausea and vomiting. Genitourinary: Negative for decreased urine volume and dysuria. Skin: Negative for rash. Neurological: Negative for dizziness, weakness, light-headedness, numbness and headaches. PHYSICAL EXAM   (up to 7 for level 4, 8 or more for level 5)     INITIAL VITALS:   BP (!) 146/74   Pulse 84   Temp 97.9 °F (36.6 °C)   Resp 19   SpO2 95%     Physical Exam  Constitutional:       General: She is not in acute distress. Appearance: Normal appearance.  She is not ill-appearing. HENT:      Head: Normocephalic and atraumatic. Nose: No congestion or rhinorrhea. Mouth/Throat:      Mouth: Mucous membranes are moist. No oral lesions. Pharynx: Pharyngeal swelling present. No oropharyngeal exudate, posterior oropharyngeal erythema or uvula swelling. Tonsils: No tonsillar exudate or tonsillar abscesses. 2+ on the right. 2+ on the left. Eyes:      General:         Right eye: No discharge. Left eye: No discharge. Extraocular Movements: Extraocular movements intact. Right eye: Normal extraocular motion. Left eye: Normal extraocular motion. Pupils: Pupils are equal, round, and reactive to light. Cardiovascular:      Rate and Rhythm: Normal rate. Pulmonary:      Effort: Pulmonary effort is normal. No respiratory distress. Musculoskeletal:      Right lower leg: No edema. Left lower leg: No edema. Lymphadenopathy:      Cervical: No cervical adenopathy. Neurological:      General: No focal deficit present. Mental Status: She is alert and oriented to person, place, and time. DIFFERENTIAL  DIAGNOSIS     With some tonsillar hypertrophy noted no exudates no cervical lymphadenopathy. Complaining of chest pain, and what sounds like a anxiety attack. Symptoms of chest pain minimal, shortness of breath resolved. And numbness and tingling also resolved. We will plan on throat swab, Motrin we will plan on soft tissue neck patient's neck is supple and Motile, no concern for pharyngeal or retropharyngeal abscess.     PLAN (LABS / IMAGING / EKG):  Orders Placed This Encounter   Procedures    Strep Screen Group A Throat    XR NECK SOFT TISSUE    XR CHEST PORTABLE    EKG 12 Lead       MEDICATIONS ORDERED:  Orders Placed This Encounter   Medications    ibuprofen (ADVIL;MOTRIN) tablet 800 mg    ibuprofen (IBU) 800 MG tablet     Sig: Take 1 tablet by mouth every 8 hours as needed for Pain     Dispense:  21 tablet Refill:  0    dexamethasone (DECADRON) tablet 10 mg       DIAGNOSTIC RESULTS / EMERGENCY DEPARTMENT COURSE / MDM     LABS:  Results for orders placed or performed during the hospital encounter of 11/14/21   Strep Screen Group A Throat    Specimen: Throat   Result Value Ref Range    Specimen Description . THROAT     Special Requests NOT REPORTED     Direct Exam       Rapid Strep A negative. A negative Rapid Group A Strep Screen result does not rule out the possibility of Group A Streptococci in the specimen. A Group A Strep DNA test is available upon request.       IMPRESSION: tonsils enlarged    RADIOLOGY:  XR NECK SOFT TISSUE   Final Result   No acute cardiopulmonary process. Unremarkable soft tissue radiographs of the neck. XR CHEST PORTABLE   Final Result   No acute cardiopulmonary process. Unremarkable soft tissue radiographs of the neck. EKG:  EKG shows normal sinus rhythm with normal axis no ST elevations or depressions noted, Q waves noted in lead III. EMERGENCY DEPARTMENT COURSE:  Patient symptomatically feeling better. Reviewed results of her imaging as well as EKG and negative strep swab with patient will cover with dose of Decadron due to swelling of her tonsils. Airway is patent she is handling oral secretions, no evidence of desaturation. And plan for discharge home        FINAL IMPRESSION      1.  Enlarged tonsils          DISPOSITION / PLAN     DISPOSITION Decision To Discharge 11/15/2021 12:01:00 AM      PATIENT REFERRED TO:  Kimo Matta DO  [facility]  [phone]    Schedule an appointment as soon as possible for a visit in 2 days        DISCHARGE MEDICATIONS:  New Prescriptions    IBUPROFEN (IBU) 800 MG TABLET    Take 1 tablet by mouth every 8 hours as needed for Pain       Brendon Crowder DO  12:04 AM    Attending Emergency Physician  [facility] ED    (Please note that portions of this note were completed with a voice recognition program.  Effortswere made to edit the dictations but occasionally words are mis-transcribed.)              Darrian Jenkins DO  11/15/21 0006

## 2021-12-02 ENCOUNTER — HOSPITAL ENCOUNTER (OUTPATIENT)
Age: 21
Discharge: HOME | End: 2021-12-02
Payer: COMMERCIAL

## 2021-12-02 LAB
-: ABNORMAL
ABSOLUTE EOS #: 0.84 K/UL (ref 0–0.44)
ABSOLUTE IMMATURE GRANULOCYTE: 0.04 K/UL (ref 0–0.3)
ABSOLUTE LYMPH #: 3.58 K/UL (ref 1.1–3.7)
ABSOLUTE MONO #: 0.66 K/UL (ref 0.1–1.4)
ALBUMIN SERPL-MCNC: 4.3 G/DL (ref 3.5–5.2)
ALBUMIN/GLOBULIN RATIO: 1.1 (ref 1–2.5)
ALP BLD-CCNC: 134 U/L (ref 35–104)
ALT SERPL-CCNC: 17 U/L (ref 5–33)
AMORPHOUS: ABNORMAL
ANION GAP SERPL CALCULATED.3IONS-SCNC: 13 MMOL/L (ref 9–17)
AST SERPL-CCNC: 18 U/L
BACTERIA: ABNORMAL
BASOPHILS # BLD: 1 % (ref 0–2)
BASOPHILS ABSOLUTE: 0.08 K/UL (ref 0–0.2)
BILIRUB SERPL-MCNC: 0.34 MG/DL (ref 0.3–1.2)
BILIRUBIN URINE: NEGATIVE
BUN BLDV-MCNC: 12 MG/DL (ref 6–20)
BUN/CREAT BLD: 18 (ref 9–20)
CALCIUM SERPL-MCNC: 9.6 MG/DL (ref 8.6–10.4)
CASTS UA: ABNORMAL /LPF
CHLORIDE BLD-SCNC: 104 MMOL/L (ref 98–107)
CO2: 20 MMOL/L (ref 20–31)
COLOR: YELLOW
COMMENT UA: ABNORMAL
CREAT SERPL-MCNC: 0.65 MG/DL (ref 0.5–0.9)
CRYSTALS, UA: ABNORMAL /HPF
DIFFERENTIAL TYPE: ABNORMAL
EOSINOPHILS RELATIVE PERCENT: 7 % (ref 1–4)
EPITHELIAL CELLS UA: ABNORMAL /HPF (ref 0–25)
GFR AFRICAN AMERICAN: >60 ML/MIN
GFR NON-AFRICAN AMERICAN: >60 ML/MIN
GFR SERPL CREATININE-BSD FRML MDRD: ABNORMAL ML/MIN/{1.73_M2}
GFR SERPL CREATININE-BSD FRML MDRD: ABNORMAL ML/MIN/{1.73_M2}
GLUCOSE BLD-MCNC: 80 MG/DL (ref 70–99)
GLUCOSE URINE: NEGATIVE
HCT VFR BLD CALC: 41.6 % (ref 36.3–47.1)
HEMOGLOBIN: 13.4 G/DL (ref 11.9–15.1)
IMMATURE GRANULOCYTES: 0 %
KETONES, URINE: NEGATIVE
LEUKOCYTE ESTERASE, URINE: ABNORMAL
LYMPHOCYTES # BLD: 30 % (ref 25–45)
MCH RBC QN AUTO: 27.7 PG (ref 25.2–33.5)
MCHC RBC AUTO-ENTMCNC: 32.2 G/DL (ref 28.4–34.8)
MCV RBC AUTO: 86 FL (ref 82.6–102.9)
MONOCYTES # BLD: 6 % (ref 2–8)
MUCUS: ABNORMAL
NITRITE, URINE: NEGATIVE
NRBC AUTOMATED: 0 PER 100 WBC
OTHER OBSERVATIONS UA: ABNORMAL
PDW BLD-RTO: 14.6 % (ref 11.8–14.4)
PH UA: 7 (ref 5–9)
PLATELET # BLD: 364 K/UL (ref 138–453)
PLATELET ESTIMATE: ABNORMAL
PMV BLD AUTO: 9.3 FL (ref 8.1–13.5)
POTASSIUM SERPL-SCNC: 4 MMOL/L (ref 3.7–5.3)
PROTEIN UA: NEGATIVE
RBC # BLD: 4.84 M/UL (ref 3.95–5.11)
RBC # BLD: ABNORMAL 10*6/UL
RBC UA: ABNORMAL /HPF (ref 0–2)
RENAL EPITHELIAL, UA: ABNORMAL /HPF
SEG NEUTROPHILS: 56 % (ref 34–64)
SEGMENTED NEUTROPHILS ABSOLUTE COUNT: 6.74 K/UL (ref 1.5–8.1)
SODIUM BLD-SCNC: 137 MMOL/L (ref 135–144)
SPECIFIC GRAVITY UA: 1.02 (ref 1.01–1.02)
T3 FREE: 3.55 PG/ML (ref 2.02–4.43)
THYROXINE, FREE: 1.3 NG/DL (ref 0.93–1.7)
TOTAL PROTEIN: 8.1 G/DL (ref 6.4–8.3)
TRICHOMONAS: ABNORMAL
TSH SERPL DL<=0.05 MIU/L-ACNC: 0.76 MIU/L (ref 0.3–5)
TURBIDITY: CLEAR
URINE HGB: NEGATIVE
UROBILINOGEN, URINE: NORMAL
WBC # BLD: 11.9 K/UL (ref 4.5–13.5)
WBC # BLD: ABNORMAL 10*3/UL
WBC UA: ABNORMAL /HPF (ref 0–5)
YEAST: ABNORMAL

## 2021-12-02 PROCEDURE — 81001 URINALYSIS AUTO W/SCOPE: CPT

## 2021-12-02 PROCEDURE — 84443 ASSAY THYROID STIM HORMONE: CPT

## 2021-12-02 PROCEDURE — 36415 COLL VENOUS BLD VENIPUNCTURE: CPT

## 2021-12-02 PROCEDURE — 84481 FREE ASSAY (FT-3): CPT

## 2021-12-02 PROCEDURE — 85025 COMPLETE CBC W/AUTO DIFF WBC: CPT

## 2021-12-02 PROCEDURE — 84439 ASSAY OF FREE THYROXINE: CPT

## 2021-12-02 PROCEDURE — 80053 COMPREHEN METABOLIC PANEL: CPT

## 2021-12-29 ENCOUNTER — HOSPITAL ENCOUNTER (OUTPATIENT)
Age: 21
Discharge: HOME | End: 2021-12-29
Payer: COMMERCIAL

## 2021-12-29 LAB
VITAMIN B-12: 666 PG/ML (ref 232–1245)
VITAMIN D 25-HYDROXY: 13.2 NG/ML (ref 30–100)

## 2021-12-29 PROCEDURE — 82306 VITAMIN D 25 HYDROXY: CPT

## 2021-12-29 PROCEDURE — 82607 VITAMIN B-12: CPT

## 2021-12-29 PROCEDURE — 36415 COLL VENOUS BLD VENIPUNCTURE: CPT

## 2024-09-05 ENCOUNTER — HOSPITAL ENCOUNTER (EMERGENCY)
Age: 24
Discharge: HOME | End: 2024-09-05
Attending: SPECIALIST
Payer: COMMERCIAL

## 2024-09-05 VITALS
TEMPERATURE: 98.5 F | OXYGEN SATURATION: 98 % | SYSTOLIC BLOOD PRESSURE: 138 MMHG | BODY MASS INDEX: 39.99 KG/M2 | DIASTOLIC BLOOD PRESSURE: 91 MMHG | HEART RATE: 103 BPM | WEIGHT: 240 LBS | RESPIRATION RATE: 16 BRPM | HEIGHT: 65 IN

## 2024-09-05 DIAGNOSIS — M79.661 RIGHT CALF PAIN: Primary | ICD-10-CM

## 2024-09-05 LAB — D DIMER PPP FEU-MCNC: 0.34 UG/ML FEU

## 2024-09-05 PROCEDURE — 99283 EMERGENCY DEPT VISIT LOW MDM: CPT

## 2024-09-05 PROCEDURE — 85379 FIBRIN DEGRADATION QUANT: CPT

## 2024-09-05 PROCEDURE — 36415 COLL VENOUS BLD VENIPUNCTURE: CPT

## 2024-09-05 ASSESSMENT — PAIN SCALES - GENERAL: PAINLEVEL_OUTOF10: 5

## 2024-09-05 ASSESSMENT — PAIN - FUNCTIONAL ASSESSMENT: PAIN_FUNCTIONAL_ASSESSMENT: 0-10

## 2024-09-06 NOTE — DISCHARGE INSTRUCTIONS
Please call your primary care provider to arrange for close follow-up within the next week.  For your muscle cramping you can try over-the-counter magnesium supplements do not take more than the recommended instructions as it can cause diarrhea.  If at any point you develop worsening of the calf pain, significant swelling of the calf or leg, numbness, tingling, chest pain, shortness of breath or any other cares or concerns please return to the emergency department for reevaluation.

## 2024-09-06 NOTE — ED PROVIDER NOTES
[facility] EMERGENCY DEPARTMENT  Emergency Department Encounter  Mid Level Provider     Pt Name: Alondra Ramirez  MRN: [id number]  Birthdate 2000  Date of evaluation: 9/5/24  PCP:  Lauren Nelson DO    CHIEF COMPLAINT       Chief Complaint   Patient presents with    Leg Swelling     Patient stated that she noticed her right thigh is larger than her left thigh today. Patient stated \"she has asked multiple people and they have said the same exact thing\". Patient states it hurts to walk.        HISTORY OF PRESENT ILLNESS  (Location/Symptom, Timing/Onset,Context/Setting, Quality, Duration, Modifying Factors, Severity.)      Alondra Ramirez is a 24 y.o. female who presents with complaints of right calf pain that worsens with ambulation and improves with rest that has been ongoing for the past 2 to 3 weeks.  Patient reports that this pain is intermittent but seems to be worsening over the past 2 to 3 days she was concerned that she may have a blood clot.  She reports that today she noticed that her right thigh was larger in circumference than the left because of this she came in for evaluation.  She denies any associated chest pain, shortness of breath, headaches, fevers, chills, nausea, vomiting she denies any history of DVT, PE she denies any recent long travel and has no history of any clotting disorders that she is aware of.    PAST MEDICAL /SURGICAL / SOCIAL / FAMILY HISTORY      has a past medical history of Anxiety, Prediabetes, and Seizures (HCC).     has a past surgical history that includes [place] tooth extraction.    Social History     Socioeconomic History    Marital status: Single     Spouse name: Not on file    Number of children: Not on file    Years of education: Not on file    Highest education level: Not on file   Occupational History    Not on file   Tobacco Use    Smoking status: Some Days     Types: Cigarettes     Start date: 5/2/2018    Smokeless tobacco: Never

## 2024-09-07 NOTE — ED PROVIDER NOTES
Emergency Department     Faculty Attestation    I performed a history and physical examination of the patient and discussed management with the mid level provideer. I reviewed the mid level provider's note and agree with the documented findings and plan of care. Any areas of disagreement are noted on the chart. I was personally present for the key portions of any procedures. I have documented in the chart those procedures where I was not present during the key portions. I have reviewed the emergency nurses triage note. I agree with the chief complaint, past medical history, past surgical history, allergies, medications, social and family history as documented unless otherwise noted below. Documentation of the HPI, Physical Exam and Medical Decision Making performed by medical students or scribes is based on my personal performance of the HPI, PE and MDM. For Physician Assistant/ Nurse Practitioner cases/documentation I have personally evaluated this patient and have completed at least one if not all key elements of the E/M (history, physical exam, and MDM). Additional findings are as noted.      Primary Care Physician:  Lauren Nelson DO    CHIEF COMPLAINT       Chief Complaint   Patient presents with    Leg Swelling     Patient stated that she noticed her right thigh is larger than her left thigh today. Patient stated \"she has asked multiple people and they have said the same exact thing\". Patient states it hurts to walk.        RECENT VITALS:   Temp: 98.5 °F (36.9 °C),  Pulse: (!) 103, Respirations: 16, BP: (!) 138/91    LABS:  Labs Reviewed   D-DIMER, QUANTITATIVE         PERTINENT ATTENDING PHYSICIAN COMMENTS:    24-year-old female patient presents to the emergency department for evaluation of right calf pain which worsens with ambulation and improves with rest going on for last 2 to 3 weeks.  She denies any recent long travels or prolonged immobilization and no history of DVT or PE in the past.  Patient  has mild tenderness in the right calf but Homans' sign is negative.  There is no erythema or edema.  Neurovascular examinations intact distally.  Serum D-dimer is negative.  Bedside venous Doppler study performed by nurse practitioner is negative for DVT.  Patient was reassured and discharged home with instructions to take Tylenol and/or ibuprofen as needed for the pain, elevate the right lower leg, follow-up with PCP, return anytime for worsening symptoms or any new symptoms             David Avila MD  09/07/24 [id number]